# Patient Record
Sex: FEMALE | URBAN - NONMETROPOLITAN AREA
[De-identification: names, ages, dates, MRNs, and addresses within clinical notes are randomized per-mention and may not be internally consistent; named-entity substitution may affect disease eponyms.]

---

## 2022-06-27 ENCOUNTER — OFFICE VISIT (OUTPATIENT)
Dept: URBAN - NONMETROPOLITAN AREA CLINIC 10 | Facility: CLINIC | Age: 22
End: 2022-06-27
Payer: COMMERCIAL

## 2022-06-27 DIAGNOSIS — H52.13 MYOPIA, BILATERAL: Primary | ICD-10-CM

## 2022-06-27 DIAGNOSIS — B30.0 EPIDEMIC KERATOCONJUNCTIVITIS: ICD-10-CM

## 2022-06-27 PROCEDURE — 92004 COMPRE OPH EXAM NEW PT 1/>: CPT | Performed by: OPTOMETRIST

## 2022-06-27 ASSESSMENT — VISUAL ACUITY
OS: 20/20
OD: 20/20

## 2022-06-27 ASSESSMENT — INTRAOCULAR PRESSURE
OD: 16
OS: 16

## 2022-06-27 NOTE — IMPRESSION/PLAN
Impression: Myopia, bilateral: H52.13. Plan: Diagnosis discussed. SRx released, pt edu that a temporary adjustment is expected. Advised patient to wait about 2 months before wearing her contacts. Advised patient the glasses Rx may be slightly different in a few months. Recommend continuing wearing current glasses for now. Patient will wait for contact Rx.

## 2022-06-27 NOTE — IMPRESSION/PLAN
Impression: Epidemic keratoconjunctivitis: B30.0. Plan: Discussed diagnosis in detail with patient. Discussed treatment options with patient. Patient is pregnant, steroid gtt not recommended at this time. Will continue to observe. Recommend waiting on contacts until the eyes have fully healed.

## 2023-06-22 ENCOUNTER — OFFICE VISIT (OUTPATIENT)
Dept: URBAN - NONMETROPOLITAN AREA CLINIC 10 | Facility: CLINIC | Age: 23
End: 2023-06-22
Payer: COMMERCIAL

## 2023-06-22 DIAGNOSIS — H52.13 MYOPIA, BILATERAL: Primary | ICD-10-CM

## 2023-06-22 PROCEDURE — 92014 COMPRE OPH EXAM EST PT 1/>: CPT | Performed by: STUDENT IN AN ORGANIZED HEALTH CARE EDUCATION/TRAINING PROGRAM

## 2023-06-22 ASSESSMENT — INTRAOCULAR PRESSURE
OD: 15
OS: 16

## 2023-06-22 ASSESSMENT — VISUAL ACUITY
OD: 20/20
OS: 20/20